# Patient Record
(demographics unavailable — no encounter records)

---

## 2025-07-21 NOTE — HISTORY OF PRESENT ILLNESS
[FreeTextEntry1] : Patient is a 24 year old female experiencing low back pain. Pain is in the middle, radiates down b/l legs, stopping at the knees. Previously, the pain radiated to feet. She visited the ER and was prescribed medrol dose pack which is currently providing slight relief, she is on her second day. Pain is worse when rising from the bed. Pain is a 7/10. She has been experiencing this pain for 6 months, but the pain worsened 2 weeks ago. She has tried Naproxen, Cyclobenzaprine, and Dexamethasone to manage pain.

## 2025-07-21 NOTE — DISCUSSION/SUMMARY
[de-identified] :  A discussion regarding available pain management treatment options occurred with the patient. These included interventional, rehabilitative, pharmacological, and alternative modalities. We will proceed with the following:   Interventional treatment options: deferred at this time   Imaging: deferred at this time   Rehabilitative options: 1. HEP provided for lumbar disc  2. The patient was provided with a prescription for Physical Therapy for low back   Medications: deferred at this time    Complementary treatment options: - lifestyle modifications discussed - avoid bending and bend with knees - avoid heavy lifting  - Follow up 4-6 weeks   I, Babs Aguirre , attest that this documentation has been prepared under the direction and in the presence of Provider Rusty Flores, DO  The documentation recorded by the scribe, in my presence, accurately reflects the service I personally performed, and the decisions made by me with my edits as appropriate.  Best Regards, Rusty Flores D.O.

## 2025-07-21 NOTE — DISCUSSION/SUMMARY
[de-identified] :  A discussion regarding available pain management treatment options occurred with the patient. These included interventional, rehabilitative, pharmacological, and alternative modalities. We will proceed with the following:   Interventional treatment options: deferred at this time   Imaging: deferred at this time   Rehabilitative options: 1. HEP provided for lumbar disc  2. The patient was provided with a prescription for Physical Therapy for low back   Medications: deferred at this time    Complementary treatment options: - lifestyle modifications discussed - avoid bending and bend with knees - avoid heavy lifting  - Follow up 4-6 weeks   I, Babs Aguirre , attest that this documentation has been prepared under the direction and in the presence of Provider Rusty Flores, DO  The documentation recorded by the scribe, in my presence, accurately reflects the service I personally performed, and the decisions made by me with my edits as appropriate.  Best Regards, Rusty Flores D.O.